# Patient Record
Sex: FEMALE | Race: WHITE | Employment: PART TIME | URBAN - METROPOLITAN AREA
[De-identification: names, ages, dates, MRNs, and addresses within clinical notes are randomized per-mention and may not be internally consistent; named-entity substitution may affect disease eponyms.]

---

## 2018-01-23 ENCOUNTER — HOSPITAL ENCOUNTER (OUTPATIENT)
Dept: MAMMOGRAPHY | Age: 63
Discharge: HOME | End: 2018-01-23
Attending: OBSTETRICS & GYNECOLOGY
Payer: SELF-PAY

## 2018-01-23 DIAGNOSIS — Z12.31 VISIT FOR SCREENING MAMMOGRAM: ICD-10-CM

## 2018-01-23 PROCEDURE — 77067 SCR MAMMO BI INCL CAD: CPT

## 2020-06-10 ENCOUNTER — HOSPITAL ENCOUNTER (OUTPATIENT)
Dept: MAMMOGRAPHY | Age: 65
Discharge: HOME | End: 2020-06-10
Attending: OBSTETRICS & GYNECOLOGY
Payer: MEDICARE

## 2020-06-10 DIAGNOSIS — Z12.31 VISIT FOR SCREENING MAMMOGRAM: ICD-10-CM

## 2020-06-10 PROCEDURE — 77063 BREAST TOMOSYNTHESIS BI: CPT

## 2022-01-03 ENCOUNTER — HOSPITAL ENCOUNTER (OUTPATIENT)
Dept: MAMMOGRAPHY | Age: 67
Discharge: HOME | End: 2022-01-03
Attending: OBSTETRICS & GYNECOLOGY
Payer: MEDICARE

## 2022-01-03 DIAGNOSIS — N64.89 OTHER SPECIFIED DISORDERS OF BREAST: ICD-10-CM

## 2022-01-03 DIAGNOSIS — N63.20 LUMP OF LEFT BREAST: ICD-10-CM

## 2022-01-03 PROCEDURE — 77062 BREAST TOMOSYNTHESIS BI: CPT

## 2022-01-03 PROCEDURE — 76642 ULTRASOUND BREAST LIMITED: CPT

## 2023-04-24 ENCOUNTER — TRANSCRIBE ORDERS (OUTPATIENT)
Dept: SCHEDULING | Age: 68
End: 2023-04-24

## 2023-04-24 DIAGNOSIS — Z12.31 ENCOUNTER FOR SCREENING MAMMOGRAM FOR MALIGNANT NEOPLASM OF BREAST: Primary | ICD-10-CM

## 2023-04-28 ENCOUNTER — HOSPITAL ENCOUNTER (OUTPATIENT)
Dept: MAMMOGRAPHY | Age: 68
End: 2023-04-28
Payer: MEDICARE

## 2023-04-28 DIAGNOSIS — Z12.31 ENCOUNTER FOR SCREENING MAMMOGRAM FOR MALIGNANT NEOPLASM OF BREAST: ICD-10-CM

## 2023-04-28 PROCEDURE — 77063 BREAST TOMOSYNTHESIS BI: CPT

## 2024-08-12 ENCOUNTER — TRANSCRIBE ORDERS (OUTPATIENT)
Dept: SCHEDULING | Age: 69
End: 2024-08-12

## 2024-08-12 DIAGNOSIS — Z12.31 SCREENING MAMMOGRAM FOR HIGH-RISK PATIENT: Primary | ICD-10-CM

## 2024-09-25 ENCOUNTER — HOSPITAL ENCOUNTER (OUTPATIENT)
Dept: MAMMOGRAPHY | Age: 69
Discharge: HOME | End: 2024-09-28
Payer: MEDICARE

## 2024-09-25 DIAGNOSIS — Z12.31 SCREENING MAMMOGRAM FOR HIGH-RISK PATIENT: ICD-10-CM

## 2024-09-25 PROCEDURE — 77063 BREAST TOMOSYNTHESIS BI: CPT

## 2025-03-13 ENCOUNTER — OFFICE VISIT (OUTPATIENT)
Dept: UROGYNECOLOGY | Age: 70
End: 2025-03-13
Payer: MEDICARE

## 2025-03-13 VITALS — BODY MASS INDEX: 23.37 KG/M2 | WEIGHT: 127 LBS | HEIGHT: 62 IN

## 2025-03-13 DIAGNOSIS — N39.3 SUI (STRESS URINARY INCONTINENCE, FEMALE): ICD-10-CM

## 2025-03-13 DIAGNOSIS — N81.3 UTEROVAGINAL PROLAPSE, COMPLETE: Primary | ICD-10-CM

## 2025-03-13 DIAGNOSIS — N39.41 URGE INCONTINENCE: ICD-10-CM

## 2025-03-13 DIAGNOSIS — N81.89 WEAKNESS OF PELVIC FLOOR: ICD-10-CM

## 2025-03-13 PROCEDURE — G8427 DOCREV CUR MEDS BY ELIG CLIN: HCPCS | Performed by: OBSTETRICS & GYNECOLOGY

## 2025-03-13 PROCEDURE — 1159F MED LIST DOCD IN RCRD: CPT | Performed by: OBSTETRICS & GYNECOLOGY

## 2025-03-13 PROCEDURE — 0509F URINE INCON PLAN DOCD: CPT | Performed by: OBSTETRICS & GYNECOLOGY

## 2025-03-13 PROCEDURE — 3017F COLORECTAL CA SCREEN DOC REV: CPT | Performed by: OBSTETRICS & GYNECOLOGY

## 2025-03-13 PROCEDURE — G8420 CALC BMI NORM PARAMETERS: HCPCS | Performed by: OBSTETRICS & GYNECOLOGY

## 2025-03-13 PROCEDURE — 51701 INSERT BLADDER CATHETER: CPT | Performed by: OBSTETRICS & GYNECOLOGY

## 2025-03-13 PROCEDURE — G8399 PT W/DXA RESULTS DOCUMENT: HCPCS | Performed by: OBSTETRICS & GYNECOLOGY

## 2025-03-13 PROCEDURE — 99204 OFFICE O/P NEW MOD 45 MIN: CPT | Performed by: OBSTETRICS & GYNECOLOGY

## 2025-03-13 PROCEDURE — 1090F PRES/ABSN URINE INCON ASSESS: CPT | Performed by: OBSTETRICS & GYNECOLOGY

## 2025-03-13 PROCEDURE — 1036F TOBACCO NON-USER: CPT | Performed by: OBSTETRICS & GYNECOLOGY

## 2025-03-13 PROCEDURE — 1123F ACP DISCUSS/DSCN MKR DOCD: CPT | Performed by: OBSTETRICS & GYNECOLOGY

## 2025-03-13 RX ORDER — LEVOTHYROXINE SODIUM 50 UG/1
50 TABLET ORAL DAILY
COMMUNITY
Start: 2025-02-25

## 2025-03-13 NOTE — ASSESSMENT & PLAN NOTE
We discussed the differential diagnosis of urinary urgency/ urge incontinence. She is aware that women leak urine for many different reasons. We discussed the epidemiology, pathogenesis, and etiology of bladder overactivity including the neurophysiologic axis.  We also discussed the treatment pathway for UUI/OAB. I offered options which include nothing, dietary modifications, medications, physical therapy, behavior modification, botox injections and neuromodulation.      My recommendations:  Please eliminate bladder irritants. This includes caffeine, artificial sweeteners, carbonated beverages, alcohol, tomato based products, citrus and spicy foods.   I highly recommend pelvic floor physical therapy. She has done this. Cont with HEP.   This is not consistent. And does not bother her that much.

## 2025-03-13 NOTE — ASSESSMENT & PLAN NOTE
We discussed the differential diagnosis of urinary incontinence. She is aware that women leak urine for multiple different reasons. Many women have more than one type of urinary incontinence. We also discussed the pathogenesis and etiology of stress urinary incontinence. I explained the epidemiology of incontinence. I offered her options which include nothing, dietary modifications, physical therapy, barrier treatment, in-office procedures and surgery.     My recommendations:  Please eliminate bladder irritants. This includes caffeine, artificial sweeteners, carbonated beverages, alcohol, tomato based products, citrus and spicy foods.   I highly recommend pelvic floor physical therapy.  She has done this.   We did discuss a pessary and incontinence knob- she will talk to her OBGYN

## 2025-03-13 NOTE — PROGRESS NOTES
[facility]  [address]  [address]  [place] [facility] [address]  Dept: [phone]        PCP:  None, None    3/13/2025      HPI:  I am being asked to see this patient in consultation by Mandie Huynh   for New Patient  .      Her main complaint today is prolapse.    Ms. Keily Escamilla has been experiencing prolapse for 6 months. The prolapse does cause her pain and/or pressure. She does have to splint to complete urination, a BM, or for comfort. Nothing seems makes her prolapse symptoms worse. Pessary makes her prolapse symptoms better. She currently wear a pessary given to her by Dr Vallejo for 6 months, she has tried physical therapy [facility] and [facility] , she has not  had surgery for her POP.     Ms. Keily Escamilla  has been leaking urine for 2 years. She leaks urine both daytime/ night time. She does leak urine with cough, laugh, sneeze and activity. She does leak urine with urgency. She uses thin and medium pads and goes through 1-2 She leaks all the time. When she leaks she leaks small amounts. She has tried PT, she has not tried medication. She has not had procedures/surgery for this in the past.     ALICE>UUI incontinence bothers her the most.     She voids 5-6 times during the day.  She voids 1 times over night.  She has 7-14 BM per week, and does not strain.    She drinks 2 caffeine drinks beverages per day. Hot tea x2   She uses 0 artificial sweeteners per day.  She drinks 0 alcoholic beverages per week.     She has not pelvic surgery in the past.   Her last PAP: 2024    Her last Colonoscopy: 2023  Her last Mammogram: 2024    She does not have a history of DM.       She does not have a history of sleep apnea.    Tobacco: No    Sexual History: single partner, contraception - none.    Notes were reviewed from the referring provider Mandie Huynh.  Results were reviewed from prior tests:     No results found for this visit on 03/13/25.    Ht 1.575 m (5' 2\")   Wt 57.6 kg (127 lb)   BMI

## 2025-03-13 NOTE — PATIENT INSTRUCTIONS
COMMON BLADDER IRRITANTS  The following is a list of foods and beverages that may irritate your bladder causing bladder contractions or \"spasms\". These bladder contractions can create the feelings of urgency and increased frequency and are associated with Overactive Bladder Syndrome. Often times, urinary incontinence may develop with increased bladder contractions.    Coffee and tea (even decaffeinated)     Carbonated beverages (Coke, Pepsi, etc.)   Cold remedies   Chocolate   Citrus (whole or juiced)   Cranberry Juice or pills   C Vitamin   Cocktails   Candy and sugars   Chili and other tomato based foods   Chinese foods (spicy or with MSG)   Cigarette smoking   Corn syrup   Crystal light and other drinks with artificial sweeteners (aspartame, NutraSweet, Equal, etc.)   Other foods such as honey     NOTE: not all of the listed substances will cause bladder irritation in all people. Avoid the offenders on the list for a few weeks to a month. Then slowly add back some favorites and see what your response is. Caffeinated drinks and alcohol are typically the worst offenders.   You must increase your fluid intake to 4-6 glasses of water a day. Avoid drinking large volumes, instead sip 2-3 ounces every 20-30 minutes. Avoid reducing fluids which may result in an increase in the concentration of the urine which can further irritate the bladder and increase symptoms of urgency and frequency. Remember, caffeinated drinks and alcohol may further dehydrate the body.  Relief tablets (calcium glycerophosphate) remove acid and neutralize foods and beverages. These should be taken with the offending food or drink. A tablespoon of baking soda in a glass of water may also reduce acidity.   Suggested Substitutes   Grape Juice   Apple Juice   Herbal teas    White chocolate    Other fruits such as apricots, melons, homegrown tomatoes, bananas, prunes & plums  Bladder Goals   Void seven times a day with each void lasting eight seconds.

## 2025-03-13 NOTE — ASSESSMENT & PLAN NOTE
We discussed the epidemiology, pathogenesis and etiology of pelvic organ prolapse. We discussed the potential risk factors which include genetics and environmental factors, such as childbirth, aging, menopause, straining, and previous surgery. I used visuals to describe the pelvic anatomy and to show her what a \"normal\" pelvic exam should look like in comparison to her exam with pelvic organ prolapse.     I offered her management options which included doing nothing, wearing a pessary, and surgery. We discussed the pros and cons of each of those options. I also described the different types of surgery that are offered for POP and the post operative expectations and restrictions.    She has been using a pessary and seems to be working fine right now.    After answering all of her questions, she is interested in continuing with her pessary.     With her ALICE, I did recommend that she speak with Dr. Vallejo about getting a pessary with an incontinence knob.